# Patient Record
Sex: FEMALE | Race: WHITE | NOT HISPANIC OR LATINO | Employment: FULL TIME | URBAN - METROPOLITAN AREA
[De-identification: names, ages, dates, MRNs, and addresses within clinical notes are randomized per-mention and may not be internally consistent; named-entity substitution may affect disease eponyms.]

---

## 2021-06-06 ENCOUNTER — HOSPITAL ENCOUNTER (EMERGENCY)
Facility: HOSPITAL | Age: 36
Discharge: HOME | End: 2021-06-06
Attending: EMERGENCY MEDICINE | Admitting: EMERGENCY MEDICINE

## 2021-06-06 ENCOUNTER — APPOINTMENT (OUTPATIENT)
Dept: GENERAL RADIOLOGY | Facility: HOSPITAL | Age: 36
End: 2021-06-06

## 2021-06-06 VITALS
SYSTOLIC BLOOD PRESSURE: 126 MMHG | DIASTOLIC BLOOD PRESSURE: 75 MMHG | WEIGHT: 160 LBS | RESPIRATION RATE: 15 BRPM | OXYGEN SATURATION: 99 % | TEMPERATURE: 98.2 F | BODY MASS INDEX: 32.25 KG/M2 | HEART RATE: 86 BPM | HEIGHT: 59 IN

## 2021-06-06 DIAGNOSIS — S83.004A CLOSED DISLOCATION OF RIGHT PATELLA, INITIAL ENCOUNTER: Primary | ICD-10-CM

## 2021-06-06 PROCEDURE — 96375 TX/PRO/DX INJ NEW DRUG ADDON: CPT

## 2021-06-06 PROCEDURE — 73560 X-RAY EXAM OF KNEE 1 OR 2: CPT

## 2021-06-06 PROCEDURE — 99284 EMERGENCY DEPT VISIT MOD MDM: CPT

## 2021-06-06 PROCEDURE — 25010000002 MIDAZOLAM PER 1 MG: Performed by: EMERGENCY MEDICINE

## 2021-06-06 PROCEDURE — 73562 X-RAY EXAM OF KNEE 3: CPT

## 2021-06-06 PROCEDURE — 25010000002 HYDROMORPHONE 1 MG/ML SOLUTION: Performed by: EMERGENCY MEDICINE

## 2021-06-06 PROCEDURE — 96374 THER/PROPH/DIAG INJ IV PUSH: CPT

## 2021-06-06 RX ORDER — IBUPROFEN 600 MG/1
600 TABLET ORAL EVERY 8 HOURS PRN
Qty: 24 TABLET | Refills: 0 | Status: SHIPPED | OUTPATIENT
Start: 2021-06-06

## 2021-06-06 RX ORDER — SODIUM CHLORIDE 0.9 % (FLUSH) 0.9 %
10 SYRINGE (ML) INJECTION AS NEEDED
Status: DISCONTINUED | OUTPATIENT
Start: 2021-06-06 | End: 2021-06-06 | Stop reason: HOSPADM

## 2021-06-06 RX ORDER — MIDAZOLAM HYDROCHLORIDE 1 MG/ML
2 INJECTION INTRAMUSCULAR; INTRAVENOUS ONCE
Status: COMPLETED | OUTPATIENT
Start: 2021-06-06 | End: 2021-06-06

## 2021-06-06 RX ADMIN — MIDAZOLAM 2 MG: 1 INJECTION INTRAMUSCULAR; INTRAVENOUS at 16:36

## 2021-06-06 RX ADMIN — HYDROMORPHONE HYDROCHLORIDE 1 MG: 1 INJECTION, SOLUTION INTRAMUSCULAR; INTRAVENOUS; SUBCUTANEOUS at 17:13

## 2021-06-06 NOTE — ED TRIAGE NOTES
Pt to the ED via [facility] after falling down a ladder on a pontoon boat and hitting her right knee on another ladder. Deformity and swelling noted to knee. Pt rates pain as 10/10. Pt placed in mask during triage. This RN wearing proper PPE, mask and glasses, during pt encounter. Hand hygiene performed before and after pt encounter.

## 2021-06-06 NOTE — ED PROVIDER NOTES
EMERGENCY DEPARTMENT ENCOUNTER    Room Number:  18/18  Date of encounter:  6/6/2021  PCP: Provider, No Known  Patient Care Team:  Provider, No Known as PCP - General   Historian: Patient, EMS, family    HPI:  Chief Complaint: Right knee pain  A complete HPI/ROS/PMH/PSH/SH/FH are unobtainable due to: Nothing    Context: Marielos Cortez is a 36 y.o. female who presents to the ED c/o right knee pain.  She reports that she was on a boat and hit her right knee on a ladder.  She reports pain to her right knee.  She was given 200 mcg of fentanyl by EMS in route here.  She is unable to straighten her leg.  She denies other injury.  She denies prior similar episode.  Movement makes it worse.  Pain medicine make it better.    Prior record review: No prior records in our system    PAST MEDICAL HISTORY  Active Ambulatory Problems     Diagnosis Date Noted   • No Active Ambulatory Problems     Resolved Ambulatory Problems     Diagnosis Date Noted   • No Resolved Ambulatory Problems     Past Medical History:   Diagnosis Date   • Disease of thyroid gland          PAST SURGICAL HISTORY  History reviewed. No pertinent surgical history.      FAMILY HISTORY  History reviewed. No pertinent family history.      SOCIAL HISTORY  Social History     Socioeconomic History   • Marital status:      Spouse name: Not on file   • Number of children: Not on file   • Years of education: Not on file   • Highest education level: Not on file   Tobacco Use   • Smoking status: Never Smoker   • Smokeless tobacco: Never Used   Substance and Sexual Activity   • Alcohol use: Not Currently   • Drug use: Never         ALLERGIES  Patient has no known allergies.        REVIEW OF SYSTEMS  Review of Systems   No weakness, no numbness, no fall, no chest pain, no shortness of breath, no abdominal pain, no nausea, no vomiting  All systems reviewed and negative except for those discussed in HPI.       PHYSICAL EXAM    I have reviewed the triage vital signs and  nursing notes.    ED Triage Vitals [06/06/21 1606]   Temp Heart Rate Resp BP SpO2   98.2 °F (36.8 °C) 86 15 102/54 98 %      Temp src Heart Rate Source Patient Position BP Location FiO2 (%)   Tympanic -- -- -- --       Physical Exam  GENERAL: Awake, alert, anxious and in moderate pain distress  SKIN: Warm, dry  HENT: Normocephalic, atraumatic  EYES: no scleral icterus  CV: regular rhythm, regular rate  RESPIRATORY: normal effort, lungs clear  ABDOMEN: soft, nontender, nondistended  MUSCULOSKELETAL: Right patella displaced laterally.  No open wounds.  No tenderness to the tibia, fibula, femur.  Distal pulses strong.  Sensation and motor intact.  Knee held in mild flexion.  NEURO: alert, moves all extremities, follows commands          LAB RESULTS  No results found for this or any previous visit (from the past 24 hour(s)).    Ordered the above labs and independently reviewed the results.        RADIOLOGY  XR Knee 1 or 2 View Right    Result Date: 6/6/2021  Patient: AKOSUA KELLERRI  Time Out: 18:21 Exam(s): FILM RIGHT KNEE EXAM:   XR Right Knee, 2 Views CLINICAL HISTORY:    Reason for exam: post reduction. TECHNIQUE:   Frontal and lateral views of the right knee. COMPARISON:   Right knee x-rays obtained on 06 06 21 at 16:40 FINDINGS:   Bones joints:  Post reduction films demonstrating anatomic alignment of the patella.  No fracture seen.  Small suprapatellar effusion.   Soft tissues:  Unremarkable. No radiodense foreign bodies.  No soft tissue gas lucencies. IMPRESSION:     1.  Post reduction films demonstrating anatomic alignment of the patella.  No fracture seen. 2.  Small suprapatellar effusion.     Electronically signed by Poncho Christine MD on 06-06-21 at 1821    XR Knee 3 View Right    Result Date: 6/6/2021  Patient: ARIANNAANGELO  Time Out: 17:50 Exam(s): FILM RIGHT KNEE EXAM:   XR Right Knee, 3 Views CLINICAL HISTORY:    Reason for exam: right knee pain, possible patella dislocation. TECHNIQUE:   Three views  of the right knee. COMPARISON:   None FINDINGS:   Bones joints: No displaced fracture.  Lateral patellar dislocation.  Mild joint space narrowing in the lateral compartment.  No bony lesion.  No significant knee joint effusion.   Soft tissues: Diffuse soft tissue prominence. IMPRESSION:     Lateral patellar dislocation.  Further evaluation could be performed with CT or MRI if clinically indicated.     Electronically signed by Korey Samano M.D. on 06-06-21 at 1750      I ordered the above noted radiological studies. Reviewed by me and discussed with radiologist.  See dictation for official radiology interpretation.      PROCEDURES    FX Dislocation    Date/Time: 6/6/2021 5:18 PM  Performed by: Fan Abrams MD  Authorized by: Fan Abrams MD     Consent:     Consent obtained:  Verbal    Consent given by:  Patient  Injury:     Injury location:  Knee    Knee injury location:  R knee    Knee fracture type: patellar    Pre-procedure assessment:     Neurological function: normal      Distal perfusion: normal      Range of motion: reduced    Sedation:     Sedation type:  Anxiolysis  Anesthesia (see MAR for exact dosages):     Anesthesia method:  None  Procedure details:     Manipulation performed: yes      Reduction successful: yes      X-ray confirmed reduction: yes      Supplies used:  Knee immobilizer  Post-procedure details:     Neurological function: normal      Distal perfusion: normal      Range of motion: normal      Patient tolerance of procedure:  Tolerated well, no immediate complications          MEDICATIONS GIVEN IN ER    Medications   sodium chloride 0.9 % flush 10 mL (has no administration in time range)   midazolam (VERSED) injection 2 mg (2 mg Intravenous Given 6/6/21 1636)   HYDROmorphone (DILAUDID) injection 1 mg (1 mg Intravenous Given 6/6/21 1713)         PROGRESS, DATA ANALYSIS, CONSULTS, AND MEDICAL DECISION MAKING    All labs have been independently reviewed by me.  All radiology studies  have been reviewed by me and discussed with radiologist dictating the report.   EKG's independently viewed and interpreted by me.  Discussion below represents my analysis of pertinent findings related to patient's condition, differential diagnosis, treatment plan and final disposition.        ED Course as of Jun 06 1830   Sun Jun 06, 2021   1705 She is much more calm and comfortable post Versed.  I reviewed work-up and findings with her.  Plan to give her a dose of pain medicine and reduce her patella.    [TR]   1717 Successful knee reduction    [TR]      ED Course User Index  [TR] Fan Abrams MD           PPE: Both the patient and I wore a surgical mask throughout the entire patient encounter. I wore protective goggles.       AS OF 18:30 EDT VITALS:    BP - 126/75  HR - 86  TEMP - 98.2 °F (36.8 °C) (Tympanic)  O2 SATS - 99%        DIAGNOSIS  Final diagnoses:   Closed dislocation of right patella, initial encounter         DISPOSITION  ED Disposition     ED Disposition Condition Comment    Discharge Stable                 Fan Abrams MD  06/06/21 0550

## 2021-09-08 ENCOUNTER — APPOINTMENT (OUTPATIENT)
Dept: GENERAL RADIOLOGY | Facility: HOSPITAL | Age: 36
End: 2021-09-08

## 2021-09-08 ENCOUNTER — TELEMEDICINE (OUTPATIENT)
Dept: FAMILY MEDICINE CLINIC | Facility: TELEHEALTH | Age: 36
End: 2021-09-08

## 2021-09-08 ENCOUNTER — HOSPITAL ENCOUNTER (EMERGENCY)
Facility: HOSPITAL | Age: 36
Discharge: HOME | End: 2021-09-08
Admitting: EMERGENCY MEDICINE

## 2021-09-08 VITALS
BODY MASS INDEX: 37.33 KG/M2 | WEIGHT: 185.19 LBS | SYSTOLIC BLOOD PRESSURE: 105 MMHG | HEART RATE: 98 BPM | HEIGHT: 59 IN | RESPIRATION RATE: 18 BRPM | OXYGEN SATURATION: 95 % | DIASTOLIC BLOOD PRESSURE: 61 MMHG | TEMPERATURE: 97.8 F

## 2021-09-08 DIAGNOSIS — U07.1 PNEUMONIA DUE TO COVID-19 VIRUS: Primary | ICD-10-CM

## 2021-09-08 DIAGNOSIS — J12.82 PNEUMONIA DUE TO COVID-19 VIRUS: Primary | ICD-10-CM

## 2021-09-08 DIAGNOSIS — R06.09 DYSPNEA ON EXERTION: ICD-10-CM

## 2021-09-08 DIAGNOSIS — R05.9 COUGH: ICD-10-CM

## 2021-09-08 DIAGNOSIS — U07.1 COVID-19 VIRUS INFECTION: Primary | ICD-10-CM

## 2021-09-08 DIAGNOSIS — R06.02 SHORTNESS OF BREATH: ICD-10-CM

## 2021-09-08 LAB
ALBUMIN SERPL-MCNC: 4 G/DL (ref 3.5–5.2)
ALBUMIN/GLOB SERPL: 1.1 G/DL
ALP SERPL-CCNC: 124 U/L (ref 39–117)
ALT SERPL W P-5'-P-CCNC: 52 U/L (ref 1–33)
ANION GAP SERPL CALCULATED.3IONS-SCNC: 13 MMOL/L (ref 5–15)
ARTERIAL PATENCY WRIST A: POSITIVE
AST SERPL-CCNC: 39 U/L (ref 1–32)
ATMOSPHERIC PRESS: ABNORMAL MM[HG]
BASE EXCESS BLDA CALC-SCNC: -1.9 MMOL/L (ref 0–3)
BASOPHILS # BLD AUTO: 0 10*3/MM3 (ref 0–0.2)
BASOPHILS NFR BLD AUTO: 0.2 % (ref 0–1.5)
BDY SITE: ABNORMAL
BILIRUB SERPL-MCNC: 0.4 MG/DL (ref 0–1.2)
BUN SERPL-MCNC: 8 MG/DL (ref 6–20)
BUN/CREAT SERPL: 10.5 (ref 7–25)
CALCIUM SPEC-SCNC: 8.7 MG/DL (ref 8.6–10.5)
CHLORIDE SERPL-SCNC: 100 MMOL/L (ref 98–107)
CO2 BLDA-SCNC: 21.7 MMOL/L (ref 22–29)
CO2 SERPL-SCNC: 22 MMOL/L (ref 22–29)
CREAT SERPL-MCNC: 0.76 MG/DL (ref 0.57–1)
D DIMER PPP FEU-MCNC: 0.53 MG/L (FEU) (ref 0–0.59)
DEPRECATED RDW RBC AUTO: 40.3 FL (ref 37–54)
EOSINOPHIL # BLD AUTO: 0.1 10*3/MM3 (ref 0–0.4)
EOSINOPHIL NFR BLD AUTO: 1.1 % (ref 0.3–6.2)
ERYTHROCYTE [DISTWIDTH] IN BLOOD BY AUTOMATED COUNT: 13.5 % (ref 12.3–15.4)
GFR SERPL CREATININE-BSD FRML MDRD: 87 ML/MIN/1.73
GLOBULIN UR ELPH-MCNC: 3.6 GM/DL
GLUCOSE SERPL-MCNC: 87 MG/DL (ref 65–99)
HCO3 BLDA-SCNC: 20.8 MMOL/L (ref 21–28)
HCT VFR BLD AUTO: 40.5 % (ref 34–46.6)
HEMODILUTION: NO
HGB BLD-MCNC: 14.1 G/DL (ref 12–15.9)
INHALED O2 CONCENTRATION: 21 %
LYMPHOCYTES # BLD AUTO: 1.5 10*3/MM3 (ref 0.7–3.1)
LYMPHOCYTES NFR BLD AUTO: 18.1 % (ref 19.6–45.3)
MCH RBC QN AUTO: 30.2 PG (ref 26.6–33)
MCHC RBC AUTO-ENTMCNC: 34.7 G/DL (ref 31.5–35.7)
MCV RBC AUTO: 86.9 FL (ref 79–97)
MODALITY: ABNORMAL
MONOCYTES # BLD AUTO: 0.6 10*3/MM3 (ref 0.1–0.9)
MONOCYTES NFR BLD AUTO: 7.1 % (ref 5–12)
NEUTROPHILS NFR BLD AUTO: 5.9 10*3/MM3 (ref 1.7–7)
NEUTROPHILS NFR BLD AUTO: 73.5 % (ref 42.7–76)
NRBC BLD AUTO-RTO: 0 /100 WBC (ref 0–0.2)
PCO2 BLDA: 29.1 MM HG (ref 35–48)
PH BLDA: 7.46 PH UNITS (ref 7.35–7.45)
PLATELET # BLD AUTO: 244 10*3/MM3 (ref 140–450)
PMV BLD AUTO: 7.5 FL (ref 6–12)
PO2 BLDA: 60.9 MM HG (ref 83–108)
POTASSIUM SERPL-SCNC: 3.8 MMOL/L (ref 3.5–5.2)
PROT SERPL-MCNC: 7.6 G/DL (ref 6–8.5)
QT INTERVAL: 362 MS
RBC # BLD AUTO: 4.66 10*6/MM3 (ref 3.77–5.28)
SAO2 % BLDCOA: 92.7 % (ref 94–98)
SODIUM SERPL-SCNC: 135 MMOL/L (ref 136–145)
WBC # BLD AUTO: 8.1 10*3/MM3 (ref 3.4–10.8)

## 2021-09-08 PROCEDURE — 25010000002 DEXAMETHASONE SODIUM PHOSPHATE 10 MG/ML SOLUTION: Performed by: NURSE PRACTITIONER

## 2021-09-08 PROCEDURE — 85025 COMPLETE CBC W/AUTO DIFF WBC: CPT | Performed by: NURSE PRACTITIONER

## 2021-09-08 PROCEDURE — 85379 FIBRIN DEGRADATION QUANT: CPT | Performed by: NURSE PRACTITIONER

## 2021-09-08 PROCEDURE — 96374 THER/PROPH/DIAG INJ IV PUSH: CPT

## 2021-09-08 PROCEDURE — 99212 OFFICE O/P EST SF 10 MIN: CPT | Performed by: NURSE PRACTITIONER

## 2021-09-08 PROCEDURE — 71045 X-RAY EXAM CHEST 1 VIEW: CPT

## 2021-09-08 PROCEDURE — 82803 BLOOD GASES ANY COMBINATION: CPT

## 2021-09-08 PROCEDURE — 80053 COMPREHEN METABOLIC PANEL: CPT | Performed by: NURSE PRACTITIONER

## 2021-09-08 PROCEDURE — 99283 EMERGENCY DEPT VISIT LOW MDM: CPT

## 2021-09-08 PROCEDURE — 93005 ELECTROCARDIOGRAM TRACING: CPT

## 2021-09-08 PROCEDURE — 36600 WITHDRAWAL OF ARTERIAL BLOOD: CPT

## 2021-09-08 RX ORDER — DEXAMETHASONE 4 MG/1
6 TABLET ORAL 2 TIMES DAILY WITH MEALS
Qty: 15 TABLET | Refills: 0 | Status: SHIPPED | OUTPATIENT
Start: 2021-09-08 | End: 2021-09-13

## 2021-09-08 RX ORDER — HYDROXYCHLOROQUINE SULFATE 200 MG/1
TABLET, FILM COATED ORAL
COMMUNITY
Start: 2021-09-03 | End: 2021-09-05

## 2021-09-08 RX ORDER — DEXAMETHASONE SODIUM PHOSPHATE 10 MG/ML
10 INJECTION, SOLUTION INTRAMUSCULAR; INTRAVENOUS ONCE
Status: COMPLETED | OUTPATIENT
Start: 2021-09-08 | End: 2021-09-08

## 2021-09-08 RX ORDER — GUAIFENESIN 1200 MG/1
TABLET, EXTENDED RELEASE ORAL
COMMUNITY
Start: 2021-08-31

## 2021-09-08 RX ORDER — SODIUM CHLORIDE 0.9 % (FLUSH) 0.9 %
10 SYRINGE (ML) INJECTION AS NEEDED
Status: DISCONTINUED | OUTPATIENT
Start: 2021-09-08 | End: 2021-09-08 | Stop reason: HOSPADM

## 2021-09-08 RX ORDER — AZITHROMYCIN 250 MG/1
250 TABLET, FILM COATED ORAL
COMMUNITY
Start: 2021-09-03 | End: 2021-09-07

## 2021-09-08 RX ADMIN — DEXAMETHASONE SODIUM PHOSPHATE 10 MG: 10 INJECTION, SOLUTION INTRAMUSCULAR; INTRAVENOUS at 10:48

## 2021-09-08 NOTE — ED PROVIDER NOTES
Subjective   Patient is a 36-year-old obese female who states she has had COVID-19 for the last 2 weeks.  She states that about 3 to 4 days ago she began to have some nausea and fatigue -cough congestion and shortness of breath she states it got worse today which brought her to the emergency room.  She denies pain.          Review of Systems   Constitutional: Positive for fatigue. Negative for chills and fever.   HENT: Positive for congestion. Negative for tinnitus and trouble swallowing.    Eyes: Negative for photophobia, discharge and redness.   Respiratory: Positive for cough and shortness of breath.    Cardiovascular: Negative for chest pain and palpitations.   Gastrointestinal: Positive for nausea. Negative for abdominal pain, diarrhea and vomiting.   Genitourinary: Negative for dysuria, frequency and urgency.   Musculoskeletal: Negative for back pain, joint swelling and myalgias.   Skin: Negative for rash.   Neurological: Negative for dizziness and headaches.   Psychiatric/Behavioral: Negative for confusion.   All other systems reviewed and are negative.      Past Medical History:   Diagnosis Date   • Disease of thyroid gland        No Known Allergies    No past surgical history on file.    No family history on file.    Social History     Socioeconomic History   • Marital status:      Spouse name: Not on file   • Number of children: Not on file   • Years of education: Not on file   • Highest education level: Not on file   Tobacco Use   • Smoking status: Never Smoker   • Smokeless tobacco: Never Used   Substance and Sexual Activity   • Alcohol use: Not Currently   • Drug use: Never           Objective   Physical Exam  Vitals reviewed.   Constitutional:       Appearance: She is well-developed. She is obese.   HENT:      Head: Normocephalic and atraumatic.   Eyes:      Conjunctiva/sclera: Conjunctivae normal.      Pupils: Pupils are equal, round, and reactive to light.   Cardiovascular:      Rate and  Rhythm: Regular rhythm. Tachycardia present.      Pulses: Normal pulses.      Heart sounds: Normal heart sounds.   Pulmonary:      Effort: Pulmonary effort is normal. No respiratory distress.      Breath sounds: Examination of the right-lower field reveals decreased breath sounds. Examination of the left-lower field reveals decreased breath sounds. Decreased breath sounds present. No wheezing.   Chest:      Chest wall: No tenderness.   Abdominal:      General: Bowel sounds are normal. There is no distension.      Palpations: Abdomen is soft. There is no mass.      Tenderness: There is no abdominal tenderness. There is no guarding or rebound.   Musculoskeletal:         General: No deformity. Normal range of motion.      Cervical back: Normal range of motion and neck supple.      Right lower leg: No tenderness. No edema.      Left lower leg: No tenderness. No edema.   Skin:     General: Skin is warm and dry.      Capillary Refill: Capillary refill takes less than 2 seconds.   Neurological:      Mental Status: She is alert and oriented to person, place, and time.      GCS: GCS eye subscore is 4. GCS verbal subscore is 5. GCS motor subscore is 6.      Cranial Nerves: No cranial nerve deficit.      Sensory: No sensory deficit.      Deep Tendon Reflexes: Reflexes normal.   Psychiatric:         Mood and Affect: Mood normal.         Behavior: Behavior normal.         Procedures           ED Course      .vs  Labs Reviewed   COMPREHENSIVE METABOLIC PANEL - Abnormal; Notable for the following components:       Result Value    Sodium 135 (*)     ALT (SGPT) 52 (*)     AST (SGOT) 39 (*)     Alkaline Phosphatase 124 (*)     All other components within normal limits    Narrative:     GFR Normal >60  Chronic Kidney Disease <60  Kidney Failure <15     CBC WITH AUTO DIFFERENTIAL - Abnormal; Notable for the following components:    Lymphocyte % 18.1 (*)     All other components within normal limits   BLOOD GAS, ARTERIAL - Abnormal;  Notable for the following components:    pH, Arterial 7.462 (*)     pCO2, Arterial 29.1 (*)     pO2, Arterial 60.9 (*)     HCO3, Arterial 20.8 (*)     Base Excess, Arterial -1.9 (*)     O2 Saturation, Arterial 92.7 (*)     CO2 Content 21.7 (*)     All other components within normal limits   D-DIMER, QUANTITATIVE - Normal    Narrative:     Reference Range  --------------------------------------------------------------------     < 0.50   Negative Predictive Value  0.50-0.59   Indeterminate    >= 0.60   Probable VTE             A very low percentage of patients with DVT may yield D-Dimer results   below the cut-off of 0.50 mg/L FEU.  This is known to be more   prevalent in patients with distal DVT.             Results of this test should always be interpreted in conjunction with   the patient's medical history, clinical presentation and other   findings.  Clinical diagnosis should not be based on the result of   INNOVANCE D-Dimer alone.   BLOOD GAS, ARTERIAL   CBC AND DIFFERENTIAL    Narrative:     The following orders were created for panel order CBC & Differential.  Procedure                               Abnormality         Status                     ---------                               -----------         ------                     CBC Auto Differential[[id number]]        Abnormal            Final result                 Please view results for these tests on the individual orders.     Medications   sodium chloride 0.9 % flush 10 mL (has no administration in time range)   dexamethasone sodium phosphate injection 10 mg (10 mg Intravenous Given 9/8/21 1048)     XR Chest 1 View    Result Date: 9/8/2021  Hazy opacities within bilateral mid lungs, concerning for pneumonia.  Electronically Signed By-Seng Handy MD On:9/8/2021 10:32 AM This report was finalized on [id number] by  Seng Handy MD.                                         MDM  Number of Diagnoses or Management Options  Dyspnea on  exertion  Pneumonia due to COVID-19 virus  Diagnosis management comments: Patient had IV established and blood work was obtained.  She was found to have Covid pneumonia on chest x-ray her ABG showed that she is mildly hypoxic with an vision saturation that is good.  She was given Decadron IV she is not acutely short of breath on reevaluation after the Decadron she states she actually feels much better.  Her oxygen saturation stayed between 93 and 96% while in the emergency room.  Her D-dimer was negative.  She is outside the window for the Regeneron monoclonal antibody treatment.  She will be discharged home with Decadron and she was given instructions to use the pulse oximeter and to return for oxygen saturation less than 90% for greater than 20 to 30 minutes she verbalized understood discharge instruction she was given another weeks worK note as well       Amount and/or Complexity of Data Reviewed  Clinical lab tests: reviewed  Tests in the radiology section of CPT®: reviewed  Tests in the medicine section of CPT®: reviewed    Risk of Complications, Morbidity, and/or Mortality  Presenting problems: low  Diagnostic procedures: low  Management options: moderate    Patient Progress  Patient progress: improved      Final diagnoses:   Pneumonia due to COVID-19 virus   Dyspnea on exertion       ED Disposition  ED Disposition     ED Disposition Condition Comment    Discharge Stable           Julisa Jones MD  [address]  [facility] [place] [place] [id number] [phone]    In 3 days  If symptoms worsen, As needed         Medication List      New Prescriptions    dexamethasone 4 MG tablet  Commonly known as: DECADRON  Take 1.5 tablets by mouth 2 (Two) Times a Day With Meals for 5 days.        ASK your doctor about these medications    azithromycin 250 MG tablet  Commonly known as: ZITHROMAX  Ask about: Should I take this medication?     hydroxychloroquine 200 MG tablet  Commonly known as: PLAQUENIL  Ask  about: Should I take this medication?           Where to Get Your Medications      These medications were sent to [id number] - [place], [place] - [address] - [phone]  - [phone] FX  [address] [place] [address]    Phone: [phone]   · dexamethasone 4 MG tablet          Cori Mcdermott, APRN  09/08/21 8546

## 2021-09-08 NOTE — DISCHARGE INSTRUCTIONS
Continue to quarantine for COVID-19 for the next week.    Use Decadron as directed    Follow-up with primary care or return to the ER if oxygen saturation is less than 90% for greater than 30 minutes.  Obtain a oxygen saturation monitor from [facility] or [facility].    Return if worse

## 2021-09-08 NOTE — PROGRESS NOTES
CHIEF COMPLAINT  Chief Complaint   Patient presents with   • Cough         HPI  Marielos Cortez is a 36 y.o. female  presents with complaint of a h/o being positive for COVID 1 week history of nasal congestion, cough, shortness of breath with any exertion.  O2 sat is dropping to 88-85%, while sitting O2 sat is 95-93%.  Feels she is having a more difficult time catching her breath.    Review of Systems   Constitutional: Positive for fatigue.   Respiratory: Positive for cough and shortness of breath.    All other systems reviewed and are negative.      Past Medical History:   Diagnosis Date   • Disease of thyroid gland        No family history on file.    Social History     Socioeconomic History   • Marital status:      Spouse name: Not on file   • Number of children: Not on file   • Years of education: Not on file   • Highest education level: Not on file   Tobacco Use   • Smoking status: Never Smoker   • Smokeless tobacco: Never Used   Substance and Sexual Activity   • Alcohol use: Not Currently   • Drug use: Never         There were no vitals taken for this visit.    PHYSICAL EXAM  Physical Exam   Constitutional: She is oriented to person, place, and time. She appears well-developed and well-nourished.   HENT:   Head: Normocephalic and atraumatic.   Pulmonary/Chest: Effort normal.  No respiratory distress (appears to be mildly short of air during visit).  Neurological: She is alert and oriented to person, place, and time.       No results found for this or any previous visit.    Diagnoses and all orders for this visit:    1. COVID-19 virus infection (Primary)    2. Cough    3. Shortness of breath    --Needs in-person evaluation at ER       FOLLOW-UP  As discussed during visit with PCP/[facility] if no improvement or Urgent Care/Emergency Department if worsening of symptoms    Patient verbalizes understanding of medication dosage, comfort measures, instructions for treatment and follow-up.    Mabel Caban,  ALLEY  09/08/2021  08:39 EDT    This visit was performed via Telehealth.  This patient has been instructed to follow-up with their primary care provider if their symptoms worsen or the treatment provided does not resolve their illness.

## 2021-09-08 NOTE — ED NOTES
Patient reports dx of covid on 08/25/21 per home test.  Symptoms at that time were cough, nausea, fever and fatigue.  Patient started feeling short of breath last night.  Had a telemedicine visit today and was recommended to come to the ER today.       Sena Blancas RN  09/08/21 9771

## 2024-07-16 ENCOUNTER — PATIENT ROUNDING (BHMG ONLY) (OUTPATIENT)
Dept: URGENT CARE | Facility: CLINIC | Age: 39
End: 2024-07-16
Payer: COMMERCIAL

## 2024-07-16 NOTE — ED NOTES
Thank you for letting us care for you in your recent visit to our urgent care center. We would love to hear about your experience with us. Was this the first time you have visited our location?    We’re always looking for ways to make our patients’ experiences even better. Do you have any recommendations on ways we may improve?     I appreciate you taking the time to respond. Please be on the lookout for a survey about your recent visit from [employer] via text or email. We would greatly appreciate if you could fill that out and turn it back in. We want your voice to be heard and we value your feedback.   Thank you for choosing [facility] for your healthcare needs.     Leslye Practice Manager